# Patient Record
Sex: MALE | Race: WHITE | NOT HISPANIC OR LATINO
[De-identification: names, ages, dates, MRNs, and addresses within clinical notes are randomized per-mention and may not be internally consistent; named-entity substitution may affect disease eponyms.]

---
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
## 2020-01-24 ENCOUNTER — APPOINTMENT (OUTPATIENT)
Dept: PSYCHIATRY | Facility: CLINIC | Age: 20
End: 2020-01-24
Payer: COMMERCIAL

## 2020-01-24 PROCEDURE — 99214 OFFICE O/P EST MOD 30 MIN: CPT

## 2020-01-24 RX ORDER — CITALOPRAM HYDROBROMIDE 40 MG/1
40 TABLET, FILM COATED ORAL DAILY
Qty: 30 | Refills: 2 | Status: DISCONTINUED | COMMUNITY
Start: 2019-07-29 | End: 2020-01-24

## 2020-05-08 ENCOUNTER — TRANSCRIPTION ENCOUNTER (OUTPATIENT)
Age: 20
End: 2020-05-08

## 2020-05-12 ENCOUNTER — APPOINTMENT (OUTPATIENT)
Dept: PSYCHIATRY | Facility: CLINIC | Age: 20
End: 2020-05-12
Payer: COMMERCIAL

## 2020-05-12 PROCEDURE — 99214 OFFICE O/P EST MOD 30 MIN: CPT | Mod: 95

## 2020-08-12 ENCOUNTER — APPOINTMENT (OUTPATIENT)
Dept: PSYCHIATRY | Facility: CLINIC | Age: 20
End: 2020-08-12

## 2020-09-02 ENCOUNTER — APPOINTMENT (OUTPATIENT)
Dept: PSYCHIATRY | Facility: CLINIC | Age: 20
End: 2020-09-02
Payer: COMMERCIAL

## 2020-09-02 PROCEDURE — 99214 OFFICE O/P EST MOD 30 MIN: CPT

## 2021-03-01 ENCOUNTER — APPOINTMENT (OUTPATIENT)
Dept: PSYCHIATRY | Facility: CLINIC | Age: 21
End: 2021-03-01

## 2021-03-12 ENCOUNTER — APPOINTMENT (OUTPATIENT)
Dept: PSYCHIATRY | Facility: CLINIC | Age: 21
End: 2021-03-12
Payer: COMMERCIAL

## 2021-03-12 PROCEDURE — 99072 ADDL SUPL MATRL&STAF TM PHE: CPT

## 2021-03-12 PROCEDURE — 99214 OFFICE O/P EST MOD 30 MIN: CPT

## 2021-09-02 ENCOUNTER — APPOINTMENT (OUTPATIENT)
Dept: PSYCHIATRY | Facility: CLINIC | Age: 21
End: 2021-09-02
Payer: COMMERCIAL

## 2021-09-02 PROCEDURE — 99214 OFFICE O/P EST MOD 30 MIN: CPT

## 2022-02-24 ENCOUNTER — APPOINTMENT (OUTPATIENT)
Dept: PSYCHIATRY | Facility: CLINIC | Age: 22
End: 2022-02-24

## 2022-04-15 ENCOUNTER — APPOINTMENT (OUTPATIENT)
Dept: PSYCHIATRY | Facility: CLINIC | Age: 22
End: 2022-04-15
Payer: COMMERCIAL

## 2022-04-15 PROCEDURE — 99214 OFFICE O/P EST MOD 30 MIN: CPT

## 2022-10-14 ENCOUNTER — APPOINTMENT (OUTPATIENT)
Dept: PSYCHIATRY | Facility: CLINIC | Age: 22
End: 2022-10-14

## 2022-10-14 PROCEDURE — 99214 OFFICE O/P EST MOD 30 MIN: CPT

## 2022-10-14 NOTE — HISTORY OF PRESENT ILLNESS
[FreeTextEntry1] : In school.  Moods have been good.  Inquired about alternative strategies for dealing with depression and anxiety.  Different programs reviewed with patient.  Socialization has been good.  No new medications or medical problems. [de-identified] : Patient is a 18-year-old male, single, currently a sophomore in college. Lives with family. Patient states she's been depressed for a long time since about 14. 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 At that point he started therapy. He continued to go down hill. He had no motivation to do things to make any changes. About a month ago he decided to go on medication. He went to urgent care where he was started on Celexa 10 mg it was increased to 20 mg about 2 weeks ago. The patient this point has not started to notice any changes he is not having any side effects with the medication. The patient has been looking for work but can't get himself motivated. He feels empty and is on negative thoughts he only has one friend he doesn't socialize she doesn't put himself out there his concentration is decreased his energy is decreased. He is not comfortable around people he has tightness in his chest shortness of breath GI upset and racing thoughts. In school he had a difficult time functioning Danis was able to participate in class. Now he has a difficult time getting out of the house yes to force himself to get out and go for a walk once a day. Unless a struggle his mother and father don't get along he doesn't get along with his sister.\par \par He gets up around noon. During the day and evening they'll spend time playing video games and talking on the phone. He doesn't about 2 to 3:00. He sometimes has difficulty falling asleep but usually can stay asleep. Appetite normal height 6 feet weight 160 pounds.

## 2022-10-14 NOTE — PHYSICAL EXAM
[None] : none [Soft] : not soft [Anxious] : no anxious [Dysphoric] : not dysphoric [Normal] : normal [Fair] : fair

## 2022-10-14 NOTE — FAMILY HISTORY
[FreeTextEntry1] : Patient [facility] for weight. Mother 57 history of depression works part-time in a store. Father 60 works for car [employer]. He has a cyst in 19. There's history of depression and possibly bipolar disorder on the mother side of the family. No alcohol or drug abuse history in the family. No abuse history growing up.

## 2022-10-14 NOTE — CURRENT PSYCHIATRIC SYMPTOMS
[Depressed Mood] : no depressed mood [Anhedonia] : no anhedonia [Excessive Worry] : no excessive worries [Restlessness] : no restlessness [de-identified] : denied [de-identified] : denied [de-identified] : denied [de-identified] : denied [de-identified] : none [de-identified] : none [de-identified] : none

## 2022-10-14 NOTE — DISCUSSION/SUMMARY
[FreeTextEntry1] : 21-year-old male with depression anxiety.  Plan continue Lexapro 30 mg BuSpar 20 mg twice a day.  Follow-up in 6 months.

## 2023-04-14 ENCOUNTER — APPOINTMENT (OUTPATIENT)
Dept: PSYCHIATRY | Facility: CLINIC | Age: 23
End: 2023-04-14

## 2023-07-05 ENCOUNTER — APPOINTMENT (OUTPATIENT)
Dept: PSYCHIATRY | Facility: CLINIC | Age: 23
End: 2023-07-05
Payer: COMMERCIAL

## 2023-07-05 PROCEDURE — 99214 OFFICE O/P EST MOD 30 MIN: CPT

## 2023-07-05 NOTE — CURRENT PSYCHIATRIC SYMPTOMS
[Depressed Mood] : no depressed mood [Anhedonia] : no anhedonia [Excessive Worry] : no excessive worries [Restlessness] : no restlessness [de-identified] : denied [de-identified] : denied [de-identified] : denied [de-identified] : denied [de-identified] : none [de-identified] : none [de-identified] : none

## 2023-07-05 NOTE — SOCIAL HISTORY
[FreeTextEntry1] : [place] He graduated from high school in 2017. He struggled in high school he was bullied to sure why. He had a small group of friends he didn't participate in any activities he was an average student. He then went to [employer] college. Patient is studying psychology. He finished his first year with a 3.0 grade when average.

## 2023-07-05 NOTE — DISCUSSION/SUMMARY
[FreeTextEntry1] : 22-year-old male with depression anxiety.  Plan continue Lexapro 30 mg taper and discontinue BuSpar.  Follow-up in 6 months.

## 2023-07-05 NOTE — PHYSICAL EXAM
[None] : none [Normal] : normal [Fair] : fair [Soft] : not soft [Anxious] : no anxious [Dysphoric] : not dysphoric

## 2023-07-05 NOTE — FAMILY HISTORY
[FreeTextEntry1] : Patient [facility] for weight. Mother 57 history of depression works part-time in a store. Father 60 works for car [employer]. He has a cyst in 19. There's history of depression and possibly bipolar disorder on the mother side of the family. No alcohol or drug abuse history in the family. No abuse history growing up.

## 2023-07-05 NOTE — HISTORY OF PRESENT ILLNESS
[FreeTextEntry1] : Patient doing well.  Moods are good.  Would like to taper off medication.  Patient does not finish college until next year.  No new medications or medical problems.  Might go down to [place] for vacation. [de-identified] : Patient is a 18-year-old male, single, currently a sophomore in college. Lives with family. Patient states she's been depressed for a long time since about 14. 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 At that point he started therapy. He continued to go down hill. He had no motivation to do things to make any changes. About a month ago he decided to go on medication. He went to urgent care where he was started on Celexa 10 mg it was increased to 20 mg about 2 weeks ago. The patient this point has not started to notice any changes he is not having any side effects with the medication. The patient has been looking for work but can't get himself motivated. He feels empty and is on negative thoughts he only has one friend he doesn't socialize she doesn't put himself out there his concentration is decreased his energy is decreased. He is not comfortable around people he has tightness in his chest shortness of breath GI upset and racing thoughts. In school he had a difficult time functioning Danis was able to participate in class. Now he has a difficult time getting out of the house yes to force himself to get out and go for a walk once a day. Unless a struggle his mother and father don't get along he doesn't get along with his sister.\par \par He gets up around noon. During the day and evening they'll spend time playing video games and talking on the phone. He doesn't about 2 to 3:00. He sometimes has difficulty falling asleep but usually can stay asleep. Appetite normal height 6 feet weight 160 pounds.

## 2023-08-24 NOTE — SOCIAL HISTORY
[FreeTextEntry1] : [place] He graduated from high school in 2017. He struggled in high school he was bullied to sure why. He had a small group of friends he didn't participate in any activities he was an average student. He then went to [employer] college. Patient is studying psychology. He finished his first year with a 3.0 grade when average.
24-Aug-2023 13:32

## 2023-12-22 ENCOUNTER — APPOINTMENT (OUTPATIENT)
Dept: PSYCHIATRY | Facility: CLINIC | Age: 23
End: 2023-12-22

## 2024-02-07 ENCOUNTER — APPOINTMENT (OUTPATIENT)
Dept: PSYCHIATRY | Facility: CLINIC | Age: 24
End: 2024-02-07

## 2024-02-07 DIAGNOSIS — F41.9 ANXIETY DISORDER, UNSPECIFIED: ICD-10-CM

## 2024-02-07 DIAGNOSIS — F32.A DEPRESSION, UNSPECIFIED: ICD-10-CM

## 2024-02-07 DIAGNOSIS — F40.10 SOCIAL PHOBIA, UNSPECIFIED: ICD-10-CM

## 2024-02-07 RX ORDER — BUSPIRONE HYDROCHLORIDE 10 MG/1
10 TABLET ORAL TWICE DAILY
Qty: 120 | Refills: 0 | Status: DISCONTINUED | COMMUNITY
Start: 2019-09-27 | End: 2024-02-07

## 2024-02-07 RX ORDER — ESCITALOPRAM OXALATE 20 MG/1
20 TABLET ORAL DAILY
Qty: 90 | Refills: 1 | Status: ACTIVE | COMMUNITY
Start: 2020-01-24 | End: 1900-01-01

## 2024-02-07 NOTE — DISCUSSION/SUMMARY
[FreeTextEntry1] : 23-year-old male with depression anxiety.  Plan continue Lexapro 30 mg.  Follow-up in 6 months.

## 2024-02-07 NOTE — FAMILY HISTORY
[FreeTextEntry1] : Patient [facility] for weight. Mother 57 history of depression works part-time in a store. Father 60 works for car medicine. He has a cyst in 19. There's history of depression and possibly bipolar disorder on the mother side of the family. No alcohol or drug abuse history in the family. No abuse history growing up.

## 2024-02-07 NOTE — SOCIAL HISTORY
[FreeTextEntry1] : [place] He graduated from high school in 2017. He struggled in high school he was bullied to sure why. He had a small group of friends he didn't participate in any activities he was an average student. He then went to [employer] college. Patient is studying psychology. He finished his first year with a 3.0 grade when average.

## 2024-02-07 NOTE — CURRENT PSYCHIATRIC SYMPTOMS
[Depressed Mood] : no depressed mood [Anhedonia] : no anhedonia [Excessive Worry] : no excessive worries [Restlessness] : no restlessness [de-identified] : denied [de-identified] : denied [de-identified] : denied [de-identified] : denied [de-identified] : none [de-identified] : none [de-identified] : none

## 2024-02-07 NOTE — HISTORY OF PRESENT ILLNESS
[de-identified] : Patient is a 18-year-old male, single, currently a sophomore in college. Lives with family. Patient states she's been depressed for a long time since about 14. 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 At that point he started therapy. He continued to go down hill. He had no motivation to do things to make any changes. About a month ago he decided to go on medication. He went to urgent care where he was started on Celexa 10 mg it was increased to 20 mg about 2 weeks ago. The patient this point has not started to notice any changes he is not having any side effects with the medication. The patient has been looking for work but can't get himself motivated. He feels empty and is on negative thoughts he only has one friend he doesn't socialize she doesn't put himself out there his concentration is decreased his energy is decreased. He is not comfortable around people he has tightness in his chest shortness of breath GI upset and racing thoughts. In school he had a difficult time functioning Danis was able to participate in class. Now he has a difficult time getting out of the house yes to force himself to get out and go for a walk once a day. Unless a struggle his mother and father don't get along he doesn't get along with his sister.\par  \par  He gets up around noon. During the day and evening they'll spend time playing video games and talking on the phone. He doesn't about 2 to 3:00. He sometimes has difficulty falling asleep but usually can stay asleep. Appetite normal height 6 feet weight 160 pounds. [FreeTextEntry1] : Moods are good.  Finishes school next semester.  Might get a masters degree.  Unsure if he wants to go into clinical psychology.  Eventually would like to try tapering off the medication.  Exercise minimal taking vitamins.

## 2024-08-07 ENCOUNTER — APPOINTMENT (OUTPATIENT)
Dept: PSYCHIATRY | Facility: CLINIC | Age: 24
End: 2024-08-07